# Patient Record
(demographics unavailable — no encounter records)

---

## 2024-11-25 NOTE — HISTORY OF PRESENT ILLNESS
[Oral Contraceptive] : uses oral contraception pills [Y] : Patient is sexually active [N] : Patient denies prior pregnancies [No] : Patient does not have concerns regarding sex [Currently Active] : currently active [Men] : men [PapSmeardate] : 05/04/2024 [TextBox_31] : Neg- [GonorrheaDate] : 05/04/2024 [TextBox_63] : Neg- [ChlamydiaDate] : 05/04/2024 [TextBox_68] : Neg- [LMPDate] : 10/20/24 [PGHxTotal] : 0 [Menarche Age: ____] : age at menarche was [unfilled] [FreeTextEntry1] : 10/20/24

## 2024-11-25 NOTE — REASON FOR VISIT
[Annual] : an annual visit. [Follow-Up] : a follow-up evaluation of [FreeTextEntry2] : Irregular menses

## 2025-07-14 NOTE — HISTORY OF PRESENT ILLNESS
[Oral Contraceptive] : uses oral contraception pills [Y] : Patient is sexually active [N] : Patient denies prior pregnancies [Menarche Age: ____] : age at menarche was [unfilled] [No] : Patient does not have concerns regarding sex [Currently Active] : currently active [Men] : men [PapSmeardate] : 5/4/2024 [TextBox_31] : NEG [HIVDate] : 1/29/2021 [TextBox_53] : NEG [SyphilisDate] : 1/29/2021 [TextBox_58] : NEG [GonorrheaDate] : 11/25/2024 [TextBox_63] : NEG [ChlamydiaDate] : 11/25/2024 [TextBox_68] : NEG [HepatitisBDate] : 1/29/2021 [TextBox_83] : NEG [HepatitisCDate] : 1/29/2021 [TextBox_88] : NEG [LMPDate] : 7/3/2025 [PGHxTotal] : 0 [FreeTextEntry1] : 7/3/2025

## 2025-07-14 NOTE — PLAN
[FreeTextEntry1] : pap smear G/c OCPs renewed RTC in 1 yr or as needed  Self-breast exams advised and discussed with pt Role of diet and exercise advised in maintaining healthy weight and BMI  Foods rich in calcium and vitamin D as well as weight bearing exercised for bone health discussed with pt Following Primary care for health maintenance advised